# Patient Record
Sex: FEMALE | Race: WHITE | Employment: FULL TIME | URBAN - METROPOLITAN AREA
[De-identification: names, ages, dates, MRNs, and addresses within clinical notes are randomized per-mention and may not be internally consistent; named-entity substitution may affect disease eponyms.]

---

## 2018-04-11 ENCOUNTER — TELEPHONE (OUTPATIENT)
Dept: OBGYN CLINIC | Facility: CLINIC | Age: 33
End: 2018-04-11

## 2018-04-11 NOTE — TELEPHONE ENCOUNTER
PT STOPPED OC'S IN January AFTER BEING ON THEM FOR 4 YEARS. LMP 2-3-18 WHICH MAKES HER 9 WEEKS, 4 DAYS TODAY. EXPLAINED PURPOSE OF OBN APPT AND PT DID AGREE TO SEE ALL THE DRS (INCLUDING BOTH MALE DRS). APPT GIVEN FOR TOMORROW.   ADVISED TO ARRIVE AT [facility]

## 2019-09-18 ENCOUNTER — OFFICE VISIT (OUTPATIENT)
Dept: FAMILY MEDICINE CLINIC | Facility: CLINIC | Age: 34
End: 2019-09-18
Payer: COMMERCIAL

## 2019-09-18 VITALS
DIASTOLIC BLOOD PRESSURE: 76 MMHG | BODY MASS INDEX: 27.51 KG/M2 | HEART RATE: 71 BPM | SYSTOLIC BLOOD PRESSURE: 111 MMHG | HEIGHT: 61.5 IN | WEIGHT: 147.63 LBS

## 2019-09-18 DIAGNOSIS — Z00.00 ROUTINE MEDICAL EXAM: Primary | ICD-10-CM

## 2019-09-18 DIAGNOSIS — N63.0 BREAST LUMP: ICD-10-CM

## 2019-09-18 PROCEDURE — 90471 IMMUNIZATION ADMIN: CPT | Performed by: FAMILY MEDICINE

## 2019-09-18 PROCEDURE — 90686 IIV4 VACC NO PRSV 0.5 ML IM: CPT | Performed by: FAMILY MEDICINE

## 2019-09-18 PROCEDURE — 99395 PREV VISIT EST AGE 18-39: CPT | Performed by: FAMILY MEDICINE

## 2019-09-18 RX ORDER — NORGESTIMATE AND ETHINYL ESTRADIOL 7DAYSX3 28
1 KIT ORAL DAILY
Qty: 84 TABLET | Refills: 4 | Status: SHIPPED | OUTPATIENT
Start: 2019-09-18 | End: 2020-11-07

## 2019-09-18 NOTE — PROGRESS NOTES
HPI:   Nirmal Peace is a 29year old female who presents for a complete physical exam.    Has been seeing different doctor but came back to me. Reports being on different OCP for 8 months but menses not consistent. Would like to go back to [facility] and [place].  Done br arm)   Pulse 71   Ht 5' 1.5\" (1.562 m)   Wt 147 lb 9.6 oz (67 kg)   LMP 09/18/2019 (Exact Date)   BMI 27.44 kg/m²     GENERAL: well developed, well nourished,in no apparent distress  SKIN: no rashes,no suspicious lesions  HEENT: atraumatic, normocephalic,

## 2020-11-07 RX ORDER — NORGESTIMATE AND ETHINYL ESTRADIOL 7DAYSX3 28
KIT ORAL
Qty: 84 TABLET | Refills: 4 | Status: SHIPPED | OUTPATIENT
Start: 2020-11-07

## 2021-02-10 ENCOUNTER — MED REC SCAN ONLY (OUTPATIENT)
Dept: FAMILY MEDICINE CLINIC | Facility: CLINIC | Age: 36
End: 2021-02-10

## 2021-10-12 ENCOUNTER — MED REC SCAN ONLY (OUTPATIENT)
Dept: INTERNAL MEDICINE CLINIC | Facility: CLINIC | Age: 36
End: 2021-10-12

## 2022-09-12 ENCOUNTER — APPOINTMENT (OUTPATIENT)
Dept: URBAN - METROPOLITAN AREA CLINIC 246 | Age: 37
Setting detail: DERMATOLOGY
End: 2022-09-12

## 2022-09-12 DIAGNOSIS — L64.8 OTHER ANDROGENIC ALOPECIA: ICD-10-CM

## 2022-09-12 DIAGNOSIS — L65.0 TELOGEN EFFLUVIUM: ICD-10-CM

## 2022-09-12 PROCEDURE — OTHER ADDITIONAL NOTES: OTHER

## 2022-09-12 PROCEDURE — OTHER PRESCRIPTION: OTHER

## 2022-09-12 PROCEDURE — 99203 OFFICE O/P NEW LOW 30 MIN: CPT

## 2022-09-12 PROCEDURE — OTHER COUNSELING: OTHER

## 2022-09-12 PROCEDURE — OTHER PRESCRIPTION MEDICATION MANAGEMENT: OTHER

## 2022-09-12 RX ORDER — SPIRONOLACTONE 100 MG/1
TABLET, FILM COATED ORAL
Qty: 30 | Refills: 2 | Status: ERX | COMMUNITY
Start: 2022-09-12

## 2022-09-12 ASSESSMENT — LOCATION SIMPLE DESCRIPTION DERM: LOCATION SIMPLE: SCALP

## 2022-09-12 ASSESSMENT — LOCATION ZONE DERM: LOCATION ZONE: SCALP

## 2022-09-12 ASSESSMENT — LOCATION DETAILED DESCRIPTION DERM: LOCATION DETAILED: LEFT SUPERIOR PARIETAL SCALP

## 2022-09-12 NOTE — PROCEDURE: ADDITIONAL NOTES
Additional Notes: Discussed OTC men’s Rogaine, Nutrafol vitamins, Spironolactone
Detail Level: Simple
Render Risk Assessment In Note?: no

## 2022-09-12 NOTE — PROCEDURE: PRESCRIPTION MEDICATION MANAGEMENT
Initiate Treatment: Spironolactone 100mg QD\\n\\nOTC Men Rogaine 5%\\n\\nNutrafol Vitamins
Render In Strict Bullet Format?: No
Detail Level: Zone

## 2023-01-06 ENCOUNTER — APPOINTMENT (OUTPATIENT)
Dept: URBAN - METROPOLITAN AREA CLINIC 246 | Age: 38
Setting detail: DERMATOLOGY
End: 2023-01-06

## 2023-01-06 DIAGNOSIS — L64.8 OTHER ANDROGENIC ALOPECIA: ICD-10-CM

## 2023-01-06 DIAGNOSIS — L65.0 TELOGEN EFFLUVIUM: ICD-10-CM

## 2023-01-06 PROCEDURE — OTHER COUNSELING: OTHER

## 2023-01-06 PROCEDURE — OTHER PRESCRIPTION MEDICATION MANAGEMENT: OTHER

## 2023-01-06 PROCEDURE — 99213 OFFICE O/P EST LOW 20 MIN: CPT

## 2023-01-06 ASSESSMENT — LOCATION DETAILED DESCRIPTION DERM: LOCATION DETAILED: LEFT SUPERIOR PARIETAL SCALP

## 2023-01-06 ASSESSMENT — LOCATION ZONE DERM: LOCATION ZONE: SCALP

## 2023-01-06 ASSESSMENT — LOCATION SIMPLE DESCRIPTION DERM: LOCATION SIMPLE: SCALP

## 2023-01-06 NOTE — PROCEDURE: PRESCRIPTION MEDICATION MANAGEMENT
Discontinue Regimen: spironolactone 100 mg tablet
Detail Level: Zone
Discontinue Regimen: Spironolactone 100mg daily
Render In Strict Bullet Format?: No
Continue Regimen: OTC Men Rogaine 5%\\n\\nNutrafol Vitamins

## (undated) NOTE — LETTER
07/21/20        Paul 36    Dear Bobbi Sales records indicate that you have outstanding lab work and or testing that was ordered for you and has not yet been completed:  Orders Placed This Encounter      CB

## (undated) NOTE — LETTER
04/21/20        Paul 36      Dear Мария Rudolph records indicate that you have outstanding lab work and or testing that was ordered for you and has not yet been completed:  Orders Placed This Encounter

## (undated) NOTE — LETTER
10/21/19        Paul 36      Dear Julianne Zhong records indicate that you have outstanding lab work and or testing that was ordered for you and has not yet been completed:  Orders Placed This Encounter